# Patient Record
Sex: FEMALE | Race: BLACK OR AFRICAN AMERICAN | Employment: FULL TIME | URBAN - METROPOLITAN AREA
[De-identification: names, ages, dates, MRNs, and addresses within clinical notes are randomized per-mention and may not be internally consistent; named-entity substitution may affect disease eponyms.]

---

## 2017-03-16 ENCOUNTER — HOSPITAL ENCOUNTER (OUTPATIENT)
Age: 27
Discharge: HOME | End: 2017-03-16
Payer: COMMERCIAL

## 2017-03-16 VITALS
WEIGHT: 125 LBS | TEMPERATURE: 99 F | BODY MASS INDEX: 20.83 KG/M2 | SYSTOLIC BLOOD PRESSURE: 106 MMHG | HEIGHT: 65 IN | DIASTOLIC BLOOD PRESSURE: 68 MMHG | OXYGEN SATURATION: 100 % | HEART RATE: 93 BPM | RESPIRATION RATE: 12 BRPM

## 2017-03-16 DIAGNOSIS — B37.3 VAGINAL YEAST INFECTION: Primary | ICD-10-CM

## 2017-03-16 LAB
B-HCG UR QL: NEGATIVE
URINE BILIRUBIN: NEGATIVE
URINE BLOOD: NEGATIVE
URINE COLOR: YELLOW
URINE GLUCOSE: NEGATIVE MG/DL
URINE KETONES: NEGATIVE MG/DL
URINE NITRITE: NEGATIVE
URINE PH: 6.5
URINE PROTEIN: NEGATIVE MG /DL
URINE SPECIFIC GRAVITY: 1.02
URINE UROBILINOGEN: 0.2 MG/DL

## 2017-03-16 PROCEDURE — 87086 URINE CULTURE/COLONY COUNT: CPT | Performed by: PHYSICIAN ASSISTANT

## 2017-03-16 PROCEDURE — 81025 URINE PREGNANCY TEST: CPT

## 2017-03-16 PROCEDURE — 87106 FUNGI IDENTIFICATION YEAST: CPT | Performed by: PHYSICIAN ASSISTANT

## 2017-03-16 PROCEDURE — 87808 TRICHOMONAS ASSAY W/OPTIC: CPT | Performed by: PHYSICIAN ASSISTANT

## 2017-03-16 PROCEDURE — 99214 OFFICE O/P EST MOD 30 MIN: CPT

## 2017-03-16 PROCEDURE — 87205 SMEAR GRAM STAIN: CPT | Performed by: PHYSICIAN ASSISTANT

## 2017-03-16 RX ORDER — FLUCONAZOLE 150 MG/1
TABLET ORAL
Qty: 2 TABLET | Refills: 0 | Status: SHIPPED | OUTPATIENT
Start: 2017-03-16 | End: 2017-09-19

## 2017-03-16 NOTE — ED PROVIDER NOTES
Patient Seen in: [facility]    History   Patient presents with:  Eval-G (gynecologic)    Stated Complaint: Gyne    HPI    Patient is a 81-year-old female who presents for possible yeast infection.   Admits to itching and i Negative. Skin: Negative. Allergic/Immunologic: Negative. Neurological: Negative. Hematological: Negative. Psychiatric/Behavioral: Negative. All other systems reviewed and are negative.       Positive for stated complaint: Gyne  Other syst normal reflexes. Skin: Skin is warm and dry. Psychiatric: She has a normal mood and affect. Her behavior is normal. Judgment and thought content normal.   Nursing note and vitals reviewed.           ED Course     Labs Reviewed   [facility] POCT URINALYSIS DIPS

## 2017-03-16 NOTE — ED INITIAL ASSESSMENT (HPI)
Patient presents with c/o possible yeast infection. +itching to vaginal area, thick white discharge.

## 2017-03-18 LAB
CANDIDA SCREEN: NEGATIVE
GENITAL VAGINOSIS SCREEN: NEGATIVE
TRICHOMONAS SCREEN: NEGATIVE

## 2017-04-26 ENCOUNTER — HOSPITAL ENCOUNTER (OUTPATIENT)
Age: 27
Discharge: HOME | End: 2017-04-26
Payer: COMMERCIAL

## 2017-04-26 VITALS
SYSTOLIC BLOOD PRESSURE: 106 MMHG | DIASTOLIC BLOOD PRESSURE: 58 MMHG | BODY MASS INDEX: 20.83 KG/M2 | TEMPERATURE: 99 F | OXYGEN SATURATION: 100 % | HEIGHT: 65 IN | RESPIRATION RATE: 20 BRPM | HEART RATE: 79 BPM | WEIGHT: 125 LBS

## 2017-04-26 DIAGNOSIS — N89.8 VAGINAL IRRITATION: Primary | ICD-10-CM

## 2017-04-26 DIAGNOSIS — B37.3 VAGINAL YEAST INFECTION: ICD-10-CM

## 2017-04-26 PROCEDURE — 81025 URINE PREGNANCY TEST: CPT

## 2017-04-26 PROCEDURE — 87205 SMEAR GRAM STAIN: CPT | Performed by: PHYSICIAN ASSISTANT

## 2017-04-26 PROCEDURE — 99214 OFFICE O/P EST MOD 30 MIN: CPT

## 2017-04-26 PROCEDURE — 99213 OFFICE O/P EST LOW 20 MIN: CPT

## 2017-04-26 PROCEDURE — 87106 FUNGI IDENTIFICATION YEAST: CPT | Performed by: PHYSICIAN ASSISTANT

## 2017-04-26 PROCEDURE — 87491 CHLMYD TRACH DNA AMP PROBE: CPT | Performed by: PHYSICIAN ASSISTANT

## 2017-04-26 PROCEDURE — 87591 N.GONORRHOEAE DNA AMP PROB: CPT | Performed by: PHYSICIAN ASSISTANT

## 2017-04-26 PROCEDURE — 81002 URINALYSIS NONAUTO W/O SCOPE: CPT

## 2017-04-26 PROCEDURE — 87808 TRICHOMONAS ASSAY W/OPTIC: CPT | Performed by: PHYSICIAN ASSISTANT

## 2017-04-26 RX ORDER — FLUCONAZOLE 150 MG/1
150 TABLET ORAL ONCE
Qty: 1 TABLET | Refills: 0 | Status: SHIPPED | OUTPATIENT
Start: 2017-04-26 | End: 2017-04-26

## 2017-04-26 NOTE — ED NOTES
Patient presents for possible yeast infection and also to be tested for STDs. Patients urinary frequency urgency and burning, denies abdominal pain or back pain.

## 2017-04-26 NOTE — ED PROVIDER NOTES
Patient Seen in: [facility]    History   Patient presents with:  Eval-G (gynecologic)    Stated Complaint: VAGINAL DISCHARGE    HPI    Patient is a 19-year-old female who presents for evaluation of vaginal discharge and i Comment: SOCIALLY       Review of Systems   Genitourinary: Positive for vaginal discharge. Positive for stated complaint: VAGINAL DISCHARGE  Other systems are as noted in HPI. Constitutional and vital signs reviewed.       All other systems reviewed reviewed.           ED Course     Labs Reviewed   [facility] POCT URINALYSIS DIPSTICK MANUAL - Abnormal; Notable for the following:     Urine Clarity Cloudy (*)     Leukocyte esterase urine Small (*)     All other components within normal limits   CHLAMYDIA/GONOCO

## 2017-04-26 NOTE — ED NOTES
Patient declined any STD treatment at this time. Patient instructed that she will be notified of any positive results. Patient given referral or GYN follow up. Patient verbalized understanding.

## 2017-04-27 ENCOUNTER — OFFICE VISIT (OUTPATIENT)
Dept: OBGYN CLINIC | Facility: CLINIC | Age: 27
End: 2017-04-27

## 2017-04-27 VITALS
BODY MASS INDEX: 22 KG/M2 | HEART RATE: 84 BPM | SYSTOLIC BLOOD PRESSURE: 104 MMHG | DIASTOLIC BLOOD PRESSURE: 72 MMHG | WEIGHT: 133 LBS

## 2017-04-27 DIAGNOSIS — N76.0 VAGINITIS AND VULVOVAGINITIS: Primary | ICD-10-CM

## 2017-04-27 PROCEDURE — 99213 OFFICE O/P EST LOW 20 MIN: CPT | Performed by: CLINICAL NURSE SPECIALIST

## 2017-04-27 NOTE — PROGRESS NOTES
Loyd Saez is a 27year old female  Patient's last menstrual period was 2017. Patient presents with:  Gyn Problem:  Follow up  Was seen in urgent care yesterday for vaginal irritation and was told to follow up here in 24 hrs to discuss why HISTORY:    Social History   Marital Status: Single  Spouse Name: N/A    Years of Education: N/A  Number of Children: N/A     Occupational History  office work       Social History Main Topics   Smoking status: Never Smoker     Smokeless tobacco: Never Use partner and they are using condoms, it very well could be related to the condoms and recommend she try either another brand or non-latex condoms to see if it helps.   Discussed strategies to decrease risk for BV or yeast infections, including mild soaps, wi

## 2017-05-01 ENCOUNTER — TELEPHONE (OUTPATIENT)
Dept: OBGYN CLINIC | Facility: CLINIC | Age: 27
End: 2017-05-01

## 2017-05-01 NOTE — TELEPHONE ENCOUNTER
Can we please call pt and let her know that all swabs done in Immediate care ( STD testing and vaginal culture) are negative for infection.       MAF

## 2017-07-20 RX ORDER — NORGESTIMATE-ETHINYL ESTRADIOL 7DAYSX3 28
1 TABLET ORAL
Qty: 84 TABLET | Refills: 0 | Status: SHIPPED | OUTPATIENT
Start: 2017-07-20 | End: 2017-09-19

## 2017-09-19 ENCOUNTER — LAB ENCOUNTER (OUTPATIENT)
Dept: LAB | Facility: HOSPITAL | Age: 27
End: 2017-09-19
Attending: OBSTETRICS & GYNECOLOGY
Payer: COMMERCIAL

## 2017-09-19 ENCOUNTER — OFFICE VISIT (OUTPATIENT)
Dept: OBGYN CLINIC | Facility: CLINIC | Age: 27
End: 2017-09-19

## 2017-09-19 VITALS
SYSTOLIC BLOOD PRESSURE: 101 MMHG | WEIGHT: 133 LBS | HEART RATE: 93 BPM | DIASTOLIC BLOOD PRESSURE: 65 MMHG | HEIGHT: 65.25 IN | BODY MASS INDEX: 21.89 KG/M2

## 2017-09-19 DIAGNOSIS — Z01.419 ENCOUNTER FOR GYNECOLOGICAL EXAMINATION: Primary | ICD-10-CM

## 2017-09-19 DIAGNOSIS — Z11.3 SCREEN FOR STD (SEXUALLY TRANSMITTED DISEASE): ICD-10-CM

## 2017-09-19 PROCEDURE — 86803 HEPATITIS C AB TEST: CPT

## 2017-09-19 PROCEDURE — 36415 COLL VENOUS BLD VENIPUNCTURE: CPT

## 2017-09-19 PROCEDURE — 99395 PREV VISIT EST AGE 18-39: CPT | Performed by: OBSTETRICS & GYNECOLOGY

## 2017-09-19 PROCEDURE — 87389 HIV-1 AG W/HIV-1&-2 AB AG IA: CPT

## 2017-09-19 PROCEDURE — 86780 TREPONEMA PALLIDUM: CPT

## 2017-09-19 RX ORDER — NORGESTIMATE AND ETHINYL ESTRADIOL 7DAYSX3 28
1 KIT ORAL
Qty: 84 TABLET | Refills: 3 | Status: SHIPPED | OUTPATIENT
Start: 2017-09-19 | End: 2018-06-05

## 2017-09-20 LAB
HCV AB SERPL QL IA: NONREACTIVE
HIV1+2 AB SERPL QL IA: NONREACTIVE
T PALLIDUM AB SER QL: NEGATIVE

## 2017-09-20 NOTE — PROGRESS NOTES
Freddie Pereira is a 27year old female  Patient's last menstrual period was 08/10/2017. here for annual exam.       Last seen 16. Last pap 2015 normal.   Had LGSIL in . Doing well with duuins and [place].     Had negative gc/chlam in urgent care abdominal pain, diarrhea or constipation  Genitourinary:  denies dysuria, incontinence, abnormal vaginal discharge, vaginal itching  Musculoskeletal:  denies back pain. Skin/Breast:  Denies any breast pain, lumps, or discharge.    Neurological:  denies hea (sexually transmitted disease)  Order for HIV, Hep C, trep ab      Signed Prescriptions Disp Refills    Norgestim-Eth Estrad Triphasic (TRINESSA, 28,) 0.18/0.215/0.25 MG-35 MCG Oral Tab 84 tablet 3      Sig: Take 1 tablet by mouth once daily.              J

## 2017-09-21 ENCOUNTER — TELEPHONE (OUTPATIENT)
Dept: FAMILY MEDICINE CLINIC | Facility: CLINIC | Age: 27
End: 2017-09-21

## 2017-09-21 DIAGNOSIS — L98.9 SKIN DISORDER: Primary | ICD-10-CM

## 2017-09-21 NOTE — TELEPHONE ENCOUNTER
Patient calling for a referral to Dermatology. She has not seen you since 8/16/2016. Do you want her to come in to see you? Please advise.

## 2017-09-29 ENCOUNTER — OFFICE VISIT (OUTPATIENT)
Dept: FAMILY MEDICINE CLINIC | Facility: CLINIC | Age: 27
End: 2017-09-29

## 2017-09-29 VITALS
HEART RATE: 74 BPM | BODY MASS INDEX: 22 KG/M2 | WEIGHT: 133 LBS | TEMPERATURE: 99 F | SYSTOLIC BLOOD PRESSURE: 99 MMHG | DIASTOLIC BLOOD PRESSURE: 64 MMHG

## 2017-09-29 DIAGNOSIS — L30.9 DERMATITIS: Primary | ICD-10-CM

## 2017-09-29 PROCEDURE — 99212 OFFICE O/P EST SF 10 MIN: CPT | Performed by: FAMILY MEDICINE

## 2017-09-29 PROCEDURE — 99213 OFFICE O/P EST LOW 20 MIN: CPT | Performed by: FAMILY MEDICINE

## 2017-09-29 NOTE — PROGRESS NOTES
HPI:    Patient ID: Ramsey Cofmfan is a 27year old female. Has skin eruption face and arm. No itchiness. No pain        Review of Systems   Constitutional: Negative. Respiratory: Negative. Negative for cough and shortness of breath.     Cardiovasc

## 2017-10-03 ENCOUNTER — TELEPHONE (OUTPATIENT)
Dept: OTHER | Age: 27
End: 2017-10-03

## 2017-10-03 NOTE — TELEPHONE ENCOUNTER
Pt called in F/U to 9/29/17 appt skin eruptions worsening and spreading around forearms bilaterally and to face. Pt made appt for 10/4/17 w/ MOUNIKA ROY. Pt wants to know if any cream can be sent to pharmacy?   Not able to get appt w/ derm until Nov

## 2017-10-04 ENCOUNTER — OFFICE VISIT (OUTPATIENT)
Dept: FAMILY MEDICINE CLINIC | Facility: CLINIC | Age: 27
End: 2017-10-04

## 2017-10-04 VITALS
WEIGHT: 135 LBS | SYSTOLIC BLOOD PRESSURE: 103 MMHG | TEMPERATURE: 98 F | HEART RATE: 91 BPM | HEIGHT: 65 IN | DIASTOLIC BLOOD PRESSURE: 66 MMHG | BODY MASS INDEX: 22.49 KG/M2

## 2017-10-04 DIAGNOSIS — L30.9 DERMATITIS: Primary | ICD-10-CM

## 2017-10-04 DIAGNOSIS — Z11.3 SCREENING FOR STD (SEXUALLY TRANSMITTED DISEASE): ICD-10-CM

## 2017-10-04 PROCEDURE — 99212 OFFICE O/P EST SF 10 MIN: CPT | Performed by: PHYSICIAN ASSISTANT

## 2017-10-04 PROCEDURE — 99213 OFFICE O/P EST LOW 20 MIN: CPT | Performed by: PHYSICIAN ASSISTANT

## 2017-10-04 RX ORDER — TRIAMCINOLONE ACETONIDE 5 MG/G
CREAM TOPICAL
Qty: 30 G | Refills: 2 | Status: SHIPPED | OUTPATIENT
Start: 2017-10-04 | End: 2019-09-30

## 2017-10-04 NOTE — PROGRESS NOTES
HPI:    Patient ID: Clay Bueno is a 27year old female. Patient presents for spots on either cheek for past few months. Spots are slightly lighter in color and are raised.   She has now developed raised slightly red spots on both arms and flat dry pa month.    Screening for STD (sexually transmitted disease)  -Chlamydia and gonorrhea screening      Orders Placed This Encounter      Chlamydia/Gc Amplification [E]    Meds This Visit:  Signed Prescriptions Disp Refills    triamcinolone acetonide 0.5 % Ext

## 2017-10-04 NOTE — TELEPHONE ENCOUNTER
Office Visit     10/4/2017  [facility], [address] [place]   Physician Assistant   Dermatitis +1 more   Dx   Derm Problem    Reason for Visit        Dermatitis  (primary encounter diagnosis)  -Trial on triamcinolone cr

## 2017-10-05 ENCOUNTER — TELEPHONE (OUTPATIENT)
Dept: FAMILY MEDICINE CLINIC | Facility: CLINIC | Age: 27
End: 2017-10-05

## 2017-10-06 RX ORDER — FLUCONAZOLE 150 MG/1
150 TABLET ORAL ONCE
Qty: 1 TABLET | Refills: 0 | Status: SHIPPED | OUTPATIENT
Start: 2017-10-06 | End: 2017-10-06

## 2017-10-06 RX ORDER — AZITHROMYCIN 500 MG/1
TABLET, FILM COATED ORAL
Qty: 2 TABLET | Refills: 0 | Status: SHIPPED | OUTPATIENT
Start: 2017-10-06 | End: 2017-11-03 | Stop reason: ALTCHOICE

## 2017-10-06 NOTE — TELEPHONE ENCOUNTER
Left message for patient to call back. I did not disclose results in message. Patient is positive for chlamydia and needs to be treated. Azithromycin 1 g sent to [facility] pharmacy [place].

## 2017-10-06 NOTE — TELEPHONE ENCOUNTER
Diflucan sent to pharmacy. Please notify patient. She should take only the antibiotic first and see if she does develop any yeast infection symptoms. She should avoid any sexual activity for one week.   She should always protect herself and have partner

## 2017-10-06 NOTE — TELEPHONE ENCOUNTER
Pt returned call. Advised of positive Chlamydia and rx sent to pharm. Pt is requesting that diflucan also be sent to the pharm. She states that anytime she is on an antibiotic, she gets a yeast infection.

## 2017-10-09 NOTE — TELEPHONE ENCOUNTER
Spoke with patient and informed of [facility] message, verbalized understanding and agreement. Advised to call back for any further questions or concerns. No further questions or concerns at this time.

## 2017-11-03 ENCOUNTER — OFFICE VISIT (OUTPATIENT)
Dept: DERMATOLOGY CLINIC | Facility: CLINIC | Age: 27
End: 2017-11-03

## 2017-11-03 DIAGNOSIS — L71.0 PERIORAL DERMATITIS: ICD-10-CM

## 2017-11-03 DIAGNOSIS — L30.9 DERMATITIS: Primary | ICD-10-CM

## 2017-11-03 PROCEDURE — 99213 OFFICE O/P EST LOW 20 MIN: CPT | Performed by: DERMATOLOGY

## 2017-11-03 PROCEDURE — 99212 OFFICE O/P EST SF 10 MIN: CPT | Performed by: DERMATOLOGY

## 2017-11-13 NOTE — PROGRESS NOTES
Justice Yadav is a 27year old female. Patient presents with:  Rash: \"Established pt\"- LOV- 10-28-15 former pt of michael.  Pt presents today with rash throughout body x 1 month pt notes saw primary doctor for it and they prescribed steriod cream p Occupational History  office work       Social History Main Topics   Smoking status: Never Smoker    Smokeless tobacco: Never Used    Alcohol use Yes  0.6 oz/week    1 Shots of liquor per week         Comment: SOCIALLY     Drug use: No    Sexual Ketan Grant face,nails, hair, external eyes, including conjunctival mucosa, eyelids, oral mucosa, external ears, back, chest, abdomen, bilateral arms, bilateral legs, palms.         Remarkable for multiple erythematous scaling eczematous patches over scattered areas of of the defined types were placed in this encounter. 11/12/2017  Janna Perez      The patient indicates understanding of these issues and agrees to the plan. The patient is asked to return as noted in follow-up/ above.

## 2017-12-04 ENCOUNTER — TELEPHONE (OUTPATIENT)
Dept: OBGYN CLINIC | Facility: CLINIC | Age: 27
End: 2017-12-04

## 2017-12-04 ENCOUNTER — LAB ENCOUNTER (OUTPATIENT)
Dept: LAB | Facility: HOSPITAL | Age: 27
End: 2017-12-04
Attending: OBSTETRICS & GYNECOLOGY
Payer: COMMERCIAL

## 2017-12-04 DIAGNOSIS — Z11.3 SCREENING FOR STDS (SEXUALLY TRANSMITTED DISEASES): ICD-10-CM

## 2017-12-04 DIAGNOSIS — Z11.3 SCREENING FOR STDS (SEXUALLY TRANSMITTED DISEASES): Primary | ICD-10-CM

## 2017-12-04 PROCEDURE — 36415 COLL VENOUS BLD VENIPUNCTURE: CPT

## 2017-12-04 PROCEDURE — 87389 HIV-1 AG W/HIV-1&-2 AB AG IA: CPT

## 2017-12-04 NOTE — TELEPHONE ENCOUNTER
Pt asking for STD blood work. Asked pt if she is aware of being exposed to a specific STI and pt states she does not know. Informed pt a message will be forwarded to Maxwell Devlin [phone] to ask if pt can get STD blood work? Last annual 9/19/17.

## 2017-12-04 NOTE — TELEPHONE ENCOUNTER
Order placed for HIV. Pt informed and verbalized understanding.  Pt informed that lab can take 2-3 days to be resulted

## 2017-12-04 NOTE — TELEPHONE ENCOUNTER
Pt informed of JLKs recs and verbalized understanding. Pt stated that she would like to have HIV repeated. Message to Maxwell Devlin [phone] if OK to please order?

## 2017-12-05 ENCOUNTER — TELEPHONE (OUTPATIENT)
Dept: OBGYN CLINIC | Facility: CLINIC | Age: 27
End: 2017-12-05

## 2018-04-05 ENCOUNTER — HOSPITAL ENCOUNTER (OUTPATIENT)
Age: 28
Discharge: HOME | End: 2018-04-05
Attending: EMERGENCY MEDICINE
Payer: COMMERCIAL

## 2018-04-05 VITALS
RESPIRATION RATE: 20 BRPM | HEART RATE: 85 BPM | OXYGEN SATURATION: 100 % | SYSTOLIC BLOOD PRESSURE: 106 MMHG | WEIGHT: 132 LBS | DIASTOLIC BLOOD PRESSURE: 58 MMHG | HEIGHT: 65 IN | TEMPERATURE: 99 F | BODY MASS INDEX: 21.99 KG/M2

## 2018-04-05 DIAGNOSIS — Z11.3 SCREEN FOR STD (SEXUALLY TRANSMITTED DISEASE): Primary | ICD-10-CM

## 2018-04-05 PROCEDURE — 99213 OFFICE O/P EST LOW 20 MIN: CPT

## 2018-04-05 PROCEDURE — 99214 OFFICE O/P EST MOD 30 MIN: CPT

## 2018-04-05 PROCEDURE — 87591 N.GONORRHOEAE DNA AMP PROB: CPT | Performed by: EMERGENCY MEDICINE

## 2018-04-05 PROCEDURE — 87491 CHLMYD TRACH DNA AMP PROBE: CPT | Performed by: EMERGENCY MEDICINE

## 2018-04-05 NOTE — ED PROVIDER NOTES
Patient Seen in: [facility]    History   Patient presents with:  Eval-G (gynecologic)    Stated Complaint: STD    HPI  Patient states she was treated for chlamydia approximately 5 months ago.   Her friend was recently mauro Constitutional: She is oriented to person, place, and time. She appears well-developed and well-nourished. No distress. Well appearing   HENT:   Head: Normocephalic and atraumatic.    Right Ear: External ear normal.   Left Ear: External ear normal.   Ey

## 2018-04-05 NOTE — ED INITIAL ASSESSMENT (HPI)
PATIENT ARRIVED AMBULATORY TO ROOM. PATIENT STATES \"I HAD CHLAMYDIA BACK IN October. IM HERE BECAUSE I WANT TO BE RE-TESTED TO MAKE SURE I DON'T HAVE IT ANYMORE.  I HAVEN'T HAD SEX SINCE BUT I JUST REALLY WANT TO MAKE SURE ITS GONE\" PATIENT IS ASYMPTOMATI

## 2018-05-08 ENCOUNTER — OFFICE VISIT (OUTPATIENT)
Dept: OBGYN CLINIC | Facility: CLINIC | Age: 28
End: 2018-05-08

## 2018-05-08 VITALS
HEART RATE: 102 BPM | WEIGHT: 130 LBS | BODY MASS INDEX: 22 KG/M2 | DIASTOLIC BLOOD PRESSURE: 63 MMHG | SYSTOLIC BLOOD PRESSURE: 102 MMHG

## 2018-05-08 DIAGNOSIS — L29.3 PERINEAL IRRITATION: Primary | ICD-10-CM

## 2018-05-08 PROCEDURE — 99212 OFFICE O/P EST SF 10 MIN: CPT | Performed by: OBSTETRICS & GYNECOLOGY

## 2018-05-08 RX ORDER — NORGESTIMATE AND ETHINYL ESTRADIOL 7DAYSX3 28
KIT ORAL
COMMUNITY
Start: 2016-03-01 | End: 2018-06-05

## 2018-05-09 NOTE — PROGRESS NOTES
Diana Mora is a 24year old female  Patient's last menstrual period was 2018. Patient presents with:  Gyn Problem: vaginal irritation    Last seen 17. In 10/2017, same partner did not use condoms.   Went to Urgent Care and tested [employer] erythema. ?slight skin abrasion    Assessment & Plan:  1. Perineal irritation  Reassurance. Will observe.   RTC prn        No prescriptions requested or ordered in this encounter

## 2018-06-02 ENCOUNTER — PATIENT MESSAGE (OUTPATIENT)
Dept: OBGYN CLINIC | Facility: CLINIC | Age: 28
End: 2018-06-02

## 2018-06-02 RX ORDER — NORGESTIMATE-ETHINYL ESTRADIOL 7DAYSX3 28
TABLET ORAL
Qty: 84 TABLET | Refills: 0 | OUTPATIENT
Start: 2018-06-02

## 2018-06-02 NOTE — TELEPHONE ENCOUNTER
Refill request denied. To soon for refill request. Pt's last annual was with [facility] on 9/2017 and received 1 yr worth of refill.   Last pap on 9/15 normal.

## 2018-06-04 NOTE — TELEPHONE ENCOUNTER
From: Silvia Mendez  To: Dulce Tineo MD  Sent: 6/2/2018 1:45 PM CDT  Subject: Prescription Question    Hello, the pharmacy states that they can not refill my trinessa Rx because I am out of refills.  However I usually get a year supply when I get my annual

## 2018-06-05 RX ORDER — NORGESTIMATE-ETHINYL ESTRADIOL 7DAYSX3 28
TABLET ORAL
Qty: 84 TABLET | Refills: 0 | Status: SHIPPED | OUTPATIENT
Start: 2018-06-05 | End: 2018-08-23

## 2018-06-05 NOTE — TELEPHONE ENCOUNTER
Received OCP rx refill from pt's pharmacy. Original rx on 9/19/17 was sent to [facility] in [place] for refills for one year. 3 month supply OCP rx sent in to [facility] in [facility] (for the one remaining refill). Last annual was 09/2017.   09/2015 normal Pap.

## 2018-06-06 ENCOUNTER — HOSPITAL ENCOUNTER (OUTPATIENT)
Age: 28
Discharge: HOME | End: 2018-06-06
Payer: COMMERCIAL

## 2018-06-06 VITALS
RESPIRATION RATE: 18 BRPM | WEIGHT: 130 LBS | TEMPERATURE: 98 F | DIASTOLIC BLOOD PRESSURE: 55 MMHG | HEIGHT: 65 IN | BODY MASS INDEX: 21.66 KG/M2 | HEART RATE: 91 BPM | SYSTOLIC BLOOD PRESSURE: 94 MMHG | OXYGEN SATURATION: 100 %

## 2018-06-06 DIAGNOSIS — J04.0 ACUTE LARYNGITIS: Primary | ICD-10-CM

## 2018-06-06 PROCEDURE — 99214 OFFICE O/P EST MOD 30 MIN: CPT

## 2018-06-06 PROCEDURE — 99213 OFFICE O/P EST LOW 20 MIN: CPT

## 2018-06-06 PROCEDURE — 87430 STREP A AG IA: CPT

## 2018-06-06 RX ORDER — DEXAMETHASONE 4 MG/1
8 TABLET ORAL ONCE
Status: COMPLETED | OUTPATIENT
Start: 2018-06-06 | End: 2018-06-06

## 2018-06-06 NOTE — ED INITIAL ASSESSMENT (HPI)
Reports voice hoarseness since Sunday. Patient also reports cough with small amounts of sputum. Denies fevers. States she was ill with \"a cold\" over memorial day weekend.

## 2018-06-06 NOTE — ED PROVIDER NOTES
Patient presents with:  Cough/URI      HPI:     Syeda Huertas is a 24year old female who presents for evaluation of a chief complaint of mild sore throat and loss of voice for the past few days.   She states she did have URI symptoms approximately a week negative. Constitutional and Vital Signs Reviewed.     Physical Exam:      BP 94/55   Pulse 91   Temp 98.3 °F (36.8 °C) (Oral)   Resp 18   Ht 165.1 cm (5' 5\")   Wt 59 kg   LMP 05/10/2018   SpO2 100%   BMI 21.63 kg/m²   GENERAL: well developed, well nouris

## 2018-08-16 ENCOUNTER — OFFICE VISIT (OUTPATIENT)
Dept: OBGYN CLINIC | Facility: CLINIC | Age: 28
End: 2018-08-16
Payer: COMMERCIAL

## 2018-08-16 VITALS
SYSTOLIC BLOOD PRESSURE: 105 MMHG | DIASTOLIC BLOOD PRESSURE: 69 MMHG | HEART RATE: 80 BPM | WEIGHT: 132 LBS | BODY MASS INDEX: 22 KG/M2

## 2018-08-16 DIAGNOSIS — N76.6 GENITAL LABIAL ULCER: Primary | ICD-10-CM

## 2018-08-16 PROCEDURE — 99213 OFFICE O/P EST LOW 20 MIN: CPT | Performed by: OBSTETRICS & GYNECOLOGY

## 2018-08-17 LAB
HSV1 DNA SPEC QL NAA+PROBE: NEGATIVE
HSV2 DNA SPEC QL NAA+PROBE: POSITIVE

## 2018-08-17 NOTE — H&P
HPI:  The patient is a 25 yo F here c/o noticing labial pain and ?lump on left inferior labia. Not sexually active. No recent itching, discharge, or shaving pubic hair.       Reviewed medical and surgical history below       OBSTETRICS HISTORY:  Obstetric Prescriptions:   •  TRINESSA, 28, 0.18/0.215/0.25 MG-35 MCG Oral Tab, TAKE 1 TABLET BY MOUTH ONCE DAILY. , Disp: 84 tablet, Rfl: 0  •  triamcinolone acetonide 0.5 % External Cream, Apply to affected areas BID, Disp: 30 g, Rfl: 2    ALLERGIES:  No Known Sridhar

## 2018-08-18 ENCOUNTER — TELEPHONE (OUTPATIENT)
Dept: OBGYN CLINIC | Facility: CLINIC | Age: 28
End: 2018-08-18

## 2018-08-18 RX ORDER — VALACYCLOVIR HYDROCHLORIDE 1 G/1
1 TABLET, FILM COATED ORAL EVERY 12 HOURS SCHEDULED
Qty: 14 TABLET | Refills: 0 | Status: SHIPPED | OUTPATIENT
Start: 2018-08-18 | End: 2018-08-25

## 2018-08-18 NOTE — TELEPHONE ENCOUNTER
----- Message from Lon Duverney, DO sent at 8/17/2018  3:35 PM CDT -----  Please notify of results. +HSV 2 on labial culture. Needs valacyclovir 1g BID x 7 days.

## 2018-08-18 NOTE — TELEPHONE ENCOUNTER
Pt informed of results and recs and verbalized understanding. Pt's pharmacy verified and rx submitted.

## 2018-08-21 ENCOUNTER — TELEPHONE (OUTPATIENT)
Dept: OBGYN CLINIC | Facility: CLINIC | Age: 28
End: 2018-08-21

## 2018-08-21 NOTE — TELEPHONE ENCOUNTER
Pt  was unable to talk when we call her to tell her cx results.  has not been sexually active for about a year and she told this to [address] [place], [place] [address]Cr Asking how often she will need to be treated and how long would the outbreak last moving forward.  And ruby

## 2018-08-24 RX ORDER — NORGESTIMATE AND ETHINYL ESTRADIOL 7DAYSX3 28
1 KIT ORAL
Qty: 28 TABLET | Refills: 0 | Status: SHIPPED
Start: 2018-08-24 | End: 2018-09-22

## 2018-08-24 NOTE — TELEPHONE ENCOUNTER
LAST ANNUAL 9-19-17, LAST PAP 9-12-16. WAS GIVEN #[id number] WITH 3 REFILLS AT ANNUAL. WE GOT A REFILL REQUEST FOR A NEW PHARMACY ON 6-4-18 SO SENT AUTHORIZATION FOR 3 PACKS. PT IS SCHEDULED FOR NEXT ANNUAL 9-22-18. WILL NEED 1 ADDITIONAL REFILL.   RX PENDED FO

## 2018-09-22 ENCOUNTER — OFFICE VISIT (OUTPATIENT)
Dept: OBGYN CLINIC | Facility: CLINIC | Age: 28
End: 2018-09-22
Payer: COMMERCIAL

## 2018-09-22 ENCOUNTER — APPOINTMENT (OUTPATIENT)
Dept: LAB | Facility: HOSPITAL | Age: 28
End: 2018-09-22
Attending: OBSTETRICS & GYNECOLOGY
Payer: COMMERCIAL

## 2018-09-22 VITALS
HEART RATE: 91 BPM | BODY MASS INDEX: 21 KG/M2 | WEIGHT: 129 LBS | DIASTOLIC BLOOD PRESSURE: 61 MMHG | SYSTOLIC BLOOD PRESSURE: 95 MMHG

## 2018-09-22 DIAGNOSIS — Z01.419 ENCOUNTER FOR GYNECOLOGICAL EXAMINATION WITHOUT ABNORMAL FINDING: Primary | ICD-10-CM

## 2018-09-22 DIAGNOSIS — Z11.3 SCREENING FOR STD (SEXUALLY TRANSMITTED DISEASE): ICD-10-CM

## 2018-09-22 PROCEDURE — 80074 ACUTE HEPATITIS PANEL: CPT

## 2018-09-22 PROCEDURE — 87591 N.GONORRHOEAE DNA AMP PROB: CPT

## 2018-09-22 PROCEDURE — 86780 TREPONEMA PALLIDUM: CPT

## 2018-09-22 PROCEDURE — 36415 COLL VENOUS BLD VENIPUNCTURE: CPT

## 2018-09-22 PROCEDURE — 99395 PREV VISIT EST AGE 18-39: CPT | Performed by: OBSTETRICS & GYNECOLOGY

## 2018-09-22 PROCEDURE — 87389 HIV-1 AG W/HIV-1&-2 AB AG IA: CPT

## 2018-09-22 PROCEDURE — 87491 CHLMYD TRACH DNA AMP PROBE: CPT

## 2018-09-22 RX ORDER — NORGESTIMATE AND ETHINYL ESTRADIOL 7DAYSX3 28
1 KIT ORAL
Qty: 3 PACKAGE | Refills: 3 | Status: SHIPPED | OUTPATIENT
Start: 2018-09-22 | End: 2019-09-30

## 2018-09-22 NOTE — H&P
HPI:  The patient is a 42-year-old female who presents for well woman examination today. Requesting STD screen after recent diagnosis of genital herpes. She also requesting a refill on her birth control.   The patient has not been sexually active for a control/protection: OCP, Condom        Comment: new partner    Other Topics      Concerns:         Service: Not Asked        Blood Transfusions: Not Asked        Caffeine Concern: Yes          tea, soda occ        Occupational Exposure: Not Asked itching  Musculoskeletal:  denies back pain. Skin/Breast:  Denies any breast pain, lumps, or discharge. Neurological:  denies headaches, extremity weakness  Psychiatric: denies depression or anxiety.        09/22/18  0914   BP: 95/61   Pulse: 91       PH up-to-date  2. Contraception: OCPs refilled. Reviewed importance of condom use. We also discussed the prodromal symptoms of herpes and to avoid intercourse during this time or with an active outbreak. We also discussed indications for suppression. 3.  B

## 2018-09-23 LAB
C TRACH DNA SPEC QL NAA+PROBE: NEGATIVE
N GONORRHOEA DNA SPEC QL NAA+PROBE: NEGATIVE

## 2018-09-24 LAB
HAV IGM SERPL QL IA: NONREACTIVE
HBV CORE IGM SERPL QL IA: NONREACTIVE
HBV SURFACE AG SERPL QL IA: NONREACTIVE
HCV AB SERPL QL IA: NONREACTIVE
HIV1+2 AB SERPL QL IA: NONREACTIVE
T PALLIDUM AB SER QL: NEGATIVE

## 2018-09-25 ENCOUNTER — TELEPHONE (OUTPATIENT)
Dept: OBGYN CLINIC | Facility: CLINIC | Age: 28
End: 2018-09-25

## 2018-09-25 NOTE — TELEPHONE ENCOUNTER
Informed pt that Gerry Wagner needs to review her sti lab results. Informed pt that they are negative. Results from 9/22/18 for Chlamydia, GC, HIV, Hep Panel and T Pallidum were negative. Sent to Gerry Wagner to sign off.

## 2018-12-12 ENCOUNTER — TELEPHONE (OUTPATIENT)
Dept: OBGYN CLINIC | Facility: CLINIC | Age: 28
End: 2018-12-12

## 2018-12-12 ENCOUNTER — OFFICE VISIT (OUTPATIENT)
Dept: OBGYN CLINIC | Facility: CLINIC | Age: 28
End: 2018-12-12
Payer: COMMERCIAL

## 2018-12-12 VITALS
DIASTOLIC BLOOD PRESSURE: 62 MMHG | BODY MASS INDEX: 23 KG/M2 | HEART RATE: 77 BPM | SYSTOLIC BLOOD PRESSURE: 98 MMHG | WEIGHT: 138 LBS

## 2018-12-12 DIAGNOSIS — N89.8 VAGINAL DISCHARGE: Primary | ICD-10-CM

## 2018-12-12 PROCEDURE — 99213 OFFICE O/P EST LOW 20 MIN: CPT | Performed by: CLINICAL NURSE SPECIALIST

## 2018-12-12 RX ORDER — FLUCONAZOLE 150 MG/1
150 TABLET ORAL ONCE
Qty: 2 TABLET | Refills: 0 | Status: SHIPPED | OUTPATIENT
Start: 2018-12-12 | End: 2018-12-12

## 2018-12-12 NOTE — TELEPHONE ENCOUNTER
Pt called back to state that she has white thick discharge, different from she has had a yeast infection. Has a little vaginal itching. Denies a vaginal odor. Pt [facility] to see [facility] [employer]  and he is not available this evening.   Pt states one month ago she took

## 2018-12-12 NOTE — TELEPHONE ENCOUNTER
Pt stated she may have bacterial vaginosis and would like to know if rx can be filled out without having to be seen in office. pls adv.

## 2018-12-13 NOTE — PROGRESS NOTES
Gayathri Saldana is a 34year old female  Patient's last menstrual period was 2018 (approximate). Patient presents with:  Gyn Exam: Pt says that she thinks she has a yeast infection or BV.  Patient says that she started with some vaginal redness, Stress Concern: Not Asked        Weight Concern: Not Asked        Special Diet: Not Asked        Back Care: Not Asked        Exercise: Not Asked        Bike Helmet: Not Asked        Seat Belt: Not Asked        Self-Exams: Not Asked        [place] by mouth once for 1 dose.  Take one dose now and repeat dose in 3 days  -     GENITAL VAGINOSIS SCREEN; Future  -     GENITAL VAGINOSIS SCREEN        Requested Prescriptions     Signed Prescriptions Disp Refills   • fluconazole 150 MG Oral Tab 2 t

## 2018-12-15 ENCOUNTER — TELEPHONE (OUTPATIENT)
Dept: OBGYN CLINIC | Facility: CLINIC | Age: 28
End: 2018-12-15

## 2018-12-15 NOTE — TELEPHONE ENCOUNTER
Pt informed her cultures were positive for yeast.  MAF gave pt an rx for diflucan at her appt. Pt instructed to call if symptoms do not go away with meds.

## 2018-12-22 ENCOUNTER — HOSPITAL ENCOUNTER (OUTPATIENT)
Age: 28
Discharge: HOME | End: 2018-12-22
Payer: COMMERCIAL

## 2018-12-22 VITALS
HEIGHT: 65 IN | RESPIRATION RATE: 16 BRPM | HEART RATE: 67 BPM | TEMPERATURE: 98 F | OXYGEN SATURATION: 100 % | WEIGHT: 132 LBS | BODY MASS INDEX: 21.99 KG/M2 | DIASTOLIC BLOOD PRESSURE: 56 MMHG | SYSTOLIC BLOOD PRESSURE: 90 MMHG

## 2018-12-22 DIAGNOSIS — R21 FACIAL RASH: Primary | ICD-10-CM

## 2018-12-22 PROCEDURE — 99214 OFFICE O/P EST MOD 30 MIN: CPT

## 2018-12-22 PROCEDURE — 99213 OFFICE O/P EST LOW 20 MIN: CPT

## 2018-12-22 NOTE — ED INITIAL ASSESSMENT (HPI)
Pt to IC with rash left side of face at corner of mouth. States present x 2 weeks. Occasionally itchy. Has not spread. Denies changes in soaps/detergents/make up/lotions.

## 2018-12-22 NOTE — ED PROVIDER NOTES
Patient presents with:  Rash Skin Problem (integumentary)      HPI:     Anatoly Lyon is a 34year old female who presents today with a chief complaint of a rash to the left side of the face that started approximately 2 weeks ago.   The patient denies any week        Comment: SOCIALLY       Drug use: No      Sexual activity: Yes        Partners: Male        Birth control/protection: OCP, Condom        Comment: new partner    Other Topics      Concerns:         Service: Not Asked        Blood Transfu encounter:    Orders Placed This Encounter      mupirocin 2 % External Ointment          Sig: Apply 1 Application topically 3 (three) times daily for 14 days.           Dispense:  1 Tube          Refill:  0      Labs performed this visit:  No results found

## 2019-04-24 ENCOUNTER — TELEPHONE (OUTPATIENT)
Dept: OBGYN CLINIC | Facility: CLINIC | Age: 29
End: 2019-04-24

## 2019-04-24 RX ORDER — VALACYCLOVIR HYDROCHLORIDE 500 MG/1
500 TABLET, FILM COATED ORAL 2 TIMES DAILY
Qty: 6 TABLET | Refills: 0 | Status: SHIPPED | OUTPATIENT
Start: 2019-04-24 | End: 2019-04-27

## 2019-04-24 NOTE — TELEPHONE ENCOUNTER
Pt is requesting a refill on her valacyclovir. Pt was given 1 tablet (1,000mg), twice a day for 7 days on 8/18/19. Pt states she has only had that one outbreak, but feels like one may be coming on now. Pt's last annual was 9/22/18.   Message to [employer] for r

## 2019-09-30 ENCOUNTER — OFFICE VISIT (OUTPATIENT)
Dept: OBGYN CLINIC | Facility: CLINIC | Age: 29
End: 2019-09-30
Payer: COMMERCIAL

## 2019-09-30 ENCOUNTER — LAB ENCOUNTER (OUTPATIENT)
Dept: LAB | Facility: HOSPITAL | Age: 29
End: 2019-09-30
Attending: OBSTETRICS & GYNECOLOGY
Payer: COMMERCIAL

## 2019-09-30 VITALS
SYSTOLIC BLOOD PRESSURE: 95 MMHG | HEART RATE: 67 BPM | BODY MASS INDEX: 22.88 KG/M2 | DIASTOLIC BLOOD PRESSURE: 61 MMHG | WEIGHT: 139 LBS | HEIGHT: 65.4 IN

## 2019-09-30 DIAGNOSIS — Z11.3 SCREENING FOR STD (SEXUALLY TRANSMITTED DISEASE): ICD-10-CM

## 2019-09-30 DIAGNOSIS — Z01.419 WELL WOMAN EXAM: Primary | ICD-10-CM

## 2019-09-30 PROCEDURE — 99395 PREV VISIT EST AGE 18-39: CPT | Performed by: OBSTETRICS & GYNECOLOGY

## 2019-09-30 PROCEDURE — 87389 HIV-1 AG W/HIV-1&-2 AB AG IA: CPT

## 2019-09-30 PROCEDURE — 86780 TREPONEMA PALLIDUM: CPT

## 2019-09-30 PROCEDURE — 36415 COLL VENOUS BLD VENIPUNCTURE: CPT

## 2019-09-30 PROCEDURE — 80074 ACUTE HEPATITIS PANEL: CPT

## 2019-09-30 RX ORDER — DOXYCYCLINE HYCLATE 100 MG/1
100 CAPSULE ORAL 2 TIMES DAILY
COMMUNITY
End: 2019-12-02

## 2019-09-30 NOTE — H&P
HPI:  The patient is a 33 yo F here for WWE. NO GYN c/o. Monthly light cycles. Condoms for IC when active. Wants STD screen.       LPS: 9/3/15 pap neg    Reviewed medical and surgical history below       OBSTETRICS HISTORY:  OB History     T0  P file        Attends Yarsani service: Not on file        Active member of club or organization: Not on file        Attends meetings of clubs or organizations: Not on file        Relationship status: Not on file      Intimate partner violence:        Fear denies shortness of breath  Gastrointestinal:  denies heartburn, abdominal pain, diarrhea or constipation  Genitourinary:  denies dysuria, incontinence, abnormal vaginal discharge, vaginal itching  Musculoskeletal:  denies back pain.   Skin/Breast:  Denies monthly self breast exams with the patient   4. Discussed diet, exercise, MVIs  5. Vaginal and serum STD screen ordered  6.  Follow up in 1 yr

## 2019-10-02 ENCOUNTER — TELEPHONE (OUTPATIENT)
Dept: OBGYN CLINIC | Facility: CLINIC | Age: 29
End: 2019-10-02

## 2019-10-04 ENCOUNTER — PATIENT MESSAGE (OUTPATIENT)
Dept: OBGYN CLINIC | Facility: CLINIC | Age: 29
End: 2019-10-04

## 2019-10-04 NOTE — TELEPHONE ENCOUNTER
From: Hachita Brundidge  To: Robinson Krishna DO  Sent: 10/4/2019 1:21 PM CDT  Subject: Test Results Question    Hello, can someone please explain my test results to me?  I see that one of the results doesn’t say negative, but instead it says cervical/ endo

## 2019-12-02 ENCOUNTER — OFFICE VISIT (OUTPATIENT)
Dept: FAMILY MEDICINE CLINIC | Facility: CLINIC | Age: 29
End: 2019-12-02
Payer: COMMERCIAL

## 2019-12-02 VITALS
SYSTOLIC BLOOD PRESSURE: 103 MMHG | HEART RATE: 99 BPM | DIASTOLIC BLOOD PRESSURE: 69 MMHG | OXYGEN SATURATION: 98 % | HEIGHT: 65 IN | TEMPERATURE: 99 F | BODY MASS INDEX: 22.99 KG/M2 | WEIGHT: 138 LBS

## 2019-12-02 DIAGNOSIS — R05.9 COUGH: Primary | ICD-10-CM

## 2019-12-02 PROCEDURE — 99213 OFFICE O/P EST LOW 20 MIN: CPT | Performed by: PHYSICIAN ASSISTANT

## 2019-12-02 RX ORDER — DOXYCYCLINE HYCLATE 100 MG/1
100 CAPSULE ORAL 2 TIMES DAILY
COMMUNITY
End: 2019-12-02

## 2019-12-02 RX ORDER — DOXYCYCLINE HYCLATE 200 MG/1
TABLET, DELAYED RELEASE ORAL
COMMUNITY
Start: 2019-09-20

## 2019-12-02 NOTE — PROGRESS NOTES
HPI:    Patient ID: Maureen Harrell is a 34year old female. Pt presents with cold symptoms for the past 4 days. Pt has had cough. No sore throat. Has nosebleeds as well. Body aches. Has congestion. No fevers. Pt has tried otc remedies without relief.  Pt range of motion. Neck supple. Cardiovascular: Normal rate, regular rhythm and normal heart sounds. Pulmonary/Chest: Effort normal and breath sounds normal. She has no wheezes. She has no rales. Lymphadenopathy:     She has no cervical adenopathy.

## 2020-01-10 ENCOUNTER — TELEPHONE (OUTPATIENT)
Dept: OBGYN CLINIC | Facility: CLINIC | Age: 30
End: 2020-01-10

## 2020-01-10 RX ORDER — VALACYCLOVIR HYDROCHLORIDE 500 MG/1
500 TABLET, FILM COATED ORAL 2 TIMES DAILY
Qty: 6 TABLET | Refills: 0 | Status: SHIPPED | OUTPATIENT
Start: 2020-01-10

## 2020-01-10 NOTE — TELEPHONE ENCOUNTER
Pt requesting Valtrex refill for HSV outbreak. [facility]ed to [email or web address] Pt had annual [address] [employer] [email or web address] in 09/2019, negative Pap.

## 2020-03-19 ENCOUNTER — NURSE TRIAGE (OUTPATIENT)
Dept: FAMILY MEDICINE CLINIC | Facility: CLINIC | Age: 30
End: 2020-03-19

## 2020-03-19 DIAGNOSIS — R06.00 DYSPNEA, UNSPECIFIED TYPE: Primary | ICD-10-CM

## 2020-03-19 NOTE — TELEPHONE ENCOUNTER
Action Requested: Summary for Provider     []  Critical Lab, Recommendations Needed  [x] Need Additional Advice  []   FYI    []   Need Orders  [] Need Medications Sent to Pharmacy  []  Other     SUMMARY: Patient reports, \"since Tuesday night, my chest is

## 2020-03-20 ENCOUNTER — TELEPHONE (OUTPATIENT)
Dept: FAMILY MEDICINE CLINIC | Facility: CLINIC | Age: 30
End: 2020-03-20

## 2020-03-20 DIAGNOSIS — R07.89 CHEST DISCOMFORT: ICD-10-CM

## 2020-03-20 PROCEDURE — 99442 PHONE E/M BY PHYS 11-20 MIN: CPT | Performed by: FAMILY MEDICINE

## 2020-03-20 NOTE — TELEPHONE ENCOUNTER
Virtual/Telephone Check-In    Attila Davis verbally consents to a Virtual/Telephone Check-In service on 03/20/20. Patient understands and accepts financial responsibility for any deductible, co-insurance and/or co-pays associated with this service.     D

## 2020-03-30 ENCOUNTER — TELEPHONE (OUTPATIENT)
Dept: OTHER | Age: 30
End: 2020-03-30

## 2020-03-30 NOTE — TELEPHONE ENCOUNTER
Pt states she talked with Dr. Mary Henry on 3/20/2020 about symptoms of chest heaviness and tightness and random chest pain. Pt states she continues to experience random chest pain, no longer feeling chest heaviness or tightness.  States pain is a very quick pa

## 2020-03-30 NOTE — TELEPHONE ENCOUNTER
Would recommend that she get this testing done. If she has significant symptoms then go to the Er. Further recommendations per Dr. Jana King.

## 2020-03-31 NOTE — TELEPHONE ENCOUNTER
Virtual/Telephone Check-In    Bonnie Stephen does not consent verbally  a Virtual/Telephone Check-In service on 03/31/20. Monty Shetty patient is still having chest discomfort and has sharp pain that comes and goes. She believes that this is related to the anxiety.

## 2020-03-31 NOTE — TELEPHONE ENCOUNTER
Patient calling does not want to have the labs done at this time. Asking what can she do to elevate the anxiety symptoms?     Best call back number: [phone]    Informed if sees \" unknown caller \" or something unusual on the phone to please answer

## 2020-04-04 ENCOUNTER — HOSPITAL ENCOUNTER (EMERGENCY)
Facility: HOSPITAL | Age: 30
Discharge: HOME | End: 2020-04-04
Attending: EMERGENCY MEDICINE
Payer: COMMERCIAL

## 2020-04-04 ENCOUNTER — APPOINTMENT (OUTPATIENT)
Dept: GENERAL RADIOLOGY | Facility: HOSPITAL | Age: 30
End: 2020-04-04
Attending: EMERGENCY MEDICINE
Payer: COMMERCIAL

## 2020-04-04 VITALS
TEMPERATURE: 99 F | RESPIRATION RATE: 20 BRPM | DIASTOLIC BLOOD PRESSURE: 70 MMHG | HEART RATE: 69 BPM | OXYGEN SATURATION: 98 % | SYSTOLIC BLOOD PRESSURE: 122 MMHG

## 2020-04-04 DIAGNOSIS — R00.2 PALPITATIONS: Primary | ICD-10-CM

## 2020-04-04 DIAGNOSIS — R07.9 CHEST PAIN OF UNCERTAIN ETIOLOGY: ICD-10-CM

## 2020-04-04 PROCEDURE — 81025 URINE PREGNANCY TEST: CPT

## 2020-04-04 PROCEDURE — 84443 ASSAY THYROID STIM HORMONE: CPT | Performed by: EMERGENCY MEDICINE

## 2020-04-04 PROCEDURE — 99284 EMERGENCY DEPT VISIT MOD MDM: CPT

## 2020-04-04 PROCEDURE — 93005 ELECTROCARDIOGRAM TRACING: CPT

## 2020-04-04 PROCEDURE — 85060 BLOOD SMEAR INTERPRETATION: CPT | Performed by: EMERGENCY MEDICINE

## 2020-04-04 PROCEDURE — 85025 COMPLETE CBC W/AUTO DIFF WBC: CPT | Performed by: EMERGENCY MEDICINE

## 2020-04-04 PROCEDURE — 71045 X-RAY EXAM CHEST 1 VIEW: CPT | Performed by: EMERGENCY MEDICINE

## 2020-04-04 PROCEDURE — 84484 ASSAY OF TROPONIN QUANT: CPT | Performed by: EMERGENCY MEDICINE

## 2020-04-04 PROCEDURE — 36415 COLL VENOUS BLD VENIPUNCTURE: CPT

## 2020-04-04 PROCEDURE — 93010 ELECTROCARDIOGRAM REPORT: CPT | Performed by: EMERGENCY MEDICINE

## 2020-04-04 PROCEDURE — 85379 FIBRIN DEGRADATION QUANT: CPT | Performed by: EMERGENCY MEDICINE

## 2020-04-04 PROCEDURE — 80048 BASIC METABOLIC PNL TOTAL CA: CPT | Performed by: EMERGENCY MEDICINE

## 2020-04-04 RX ORDER — KETOROLAC TROMETHAMINE 10 MG/1
10 TABLET, FILM COATED ORAL EVERY 6 HOURS PRN
Qty: 20 TABLET | Refills: 0 | Status: SHIPPED | OUTPATIENT
Start: 2020-04-04 | End: 2020-04-04

## 2020-04-04 RX ORDER — KETOROLAC TROMETHAMINE 10 MG/1
10 TABLET, FILM COATED ORAL EVERY 6 HOURS PRN
Qty: 20 TABLET | Refills: 0 | Status: SHIPPED | OUTPATIENT
Start: 2020-04-04 | End: 2020-04-11

## 2020-04-04 NOTE — ED PROVIDER NOTES
Patient Seen in: [facility] AND [facility] Emergency Department      History   Patient presents with:  Arrythmia/Palpitations    Stated Complaint:     HPI    68-year-old female with no significant past medical history here with complaints of intermittent chest p flank pain and frequency. Musculoskeletal: Negative for back pain. Skin: Negative for rash. Neurological: Negative for weakness, light-headedness and headaches. All other systems reviewed and are negative.       Positive for stated complaint:   Othe Course     EKG    Rate, intervals and axes as noted on EKG Report.   Rate: 96  Rhythm: Sinus Rhythm  Reading: normal for rate, normal for rhythm, no acute ST changes    Cardiac Monitor:    Patient placed on the cardiac monitor and a rhythm strip obtained wi 8:42 AM   Result Value Ref Range    POCT Urine Pregnancy Negative Negative       Imaging Results Available and Reviewed by me while in ED:  Xr Chest Ap Portable  (cpt=71045)    Result Date: 4/4/2020  CONCLUSION: Normal examination.      Dictated by (CST): P MD  [address] [facility]  [phone]    Schedule an appointment as soon as possible for a visit in 2 days  As needed        Medications Prescribed:  Current Discharge Medication List    START taking these medications    Ketorolac Tromet

## 2020-04-06 ENCOUNTER — TELEPHONE (OUTPATIENT)
Dept: FAMILY MEDICINE CLINIC | Facility: CLINIC | Age: 30
End: 2020-04-06

## 2020-04-06 DIAGNOSIS — R00.2 PALPITATIONS: Primary | ICD-10-CM

## 2020-04-06 PROCEDURE — 99213 OFFICE O/P EST LOW 20 MIN: CPT | Performed by: FAMILY MEDICINE

## 2020-04-06 NOTE — TELEPHONE ENCOUNTER
Virtual/Telephone Check-In    [facility] verbally consents to a Virtual/Telephone Check-In service on 04/06/20. Patient understands and accepts financial responsibility for any deductible, co-insurance and/or co-pays associated with this service.     D

## 2020-10-09 ENCOUNTER — TELEPHONE (OUTPATIENT)
Dept: FAMILY MEDICINE CLINIC | Facility: CLINIC | Age: 30
End: 2020-10-09

## 2020-10-10 ENCOUNTER — LAB ENCOUNTER (OUTPATIENT)
Dept: LAB | Facility: HOSPITAL | Age: 30
End: 2020-10-10
Attending: FAMILY MEDICINE
Payer: COMMERCIAL

## 2020-10-10 DIAGNOSIS — R06.00 DYSPNEA, UNSPECIFIED TYPE: ICD-10-CM

## 2020-10-10 DIAGNOSIS — D70.9 NEUTROPENIA, UNSPECIFIED TYPE (HCC): ICD-10-CM

## 2020-10-10 PROCEDURE — 84443 ASSAY THYROID STIM HORMONE: CPT

## 2020-10-10 PROCEDURE — 85025 COMPLETE CBC W/AUTO DIFF WBC: CPT

## 2020-10-10 PROCEDURE — 80053 COMPREHEN METABOLIC PANEL: CPT

## 2020-10-10 PROCEDURE — 85379 FIBRIN DEGRADATION QUANT: CPT

## 2020-10-10 PROCEDURE — 36415 COLL VENOUS BLD VENIPUNCTURE: CPT
